# Patient Record
Sex: FEMALE
[De-identification: names, ages, dates, MRNs, and addresses within clinical notes are randomized per-mention and may not be internally consistent; named-entity substitution may affect disease eponyms.]

---

## 2023-06-01 PROBLEM — Z00.00 ENCOUNTER FOR PREVENTIVE HEALTH EXAMINATION: Status: ACTIVE | Noted: 2023-06-01

## 2023-06-07 ENCOUNTER — APPOINTMENT (OUTPATIENT)
Dept: ORTHOPEDIC SURGERY | Facility: CLINIC | Age: 43
End: 2023-06-07
Payer: COMMERCIAL

## 2023-06-07 VITALS — WEIGHT: 230 LBS | BODY MASS INDEX: 36.1 KG/M2 | HEIGHT: 67 IN

## 2023-06-07 DIAGNOSIS — Z82.49 FAMILY HISTORY OF ISCHEMIC HEART DISEASE AND OTHER DISEASES OF THE CIRCULATORY SYSTEM: ICD-10-CM

## 2023-06-07 DIAGNOSIS — Z80.49 FAMILY HISTORY OF MALIGNANT NEOPLASM OF OTHER GENITAL ORGANS: ICD-10-CM

## 2023-06-07 DIAGNOSIS — Z72.3 LACK OF PHYSICAL EXERCISE: ICD-10-CM

## 2023-06-07 DIAGNOSIS — Z78.9 OTHER SPECIFIED HEALTH STATUS: ICD-10-CM

## 2023-06-07 DIAGNOSIS — M67.00 SHORT ACHILLES TENDON (ACQUIRED), UNSPECIFIED ANKLE: ICD-10-CM

## 2023-06-07 DIAGNOSIS — M67.88 OTHER SPECIFIED DISORDERS OF SYNOVIUM AND TENDON, OTHER SITE: ICD-10-CM

## 2023-06-07 DIAGNOSIS — Z86.39 PERSONAL HISTORY OF OTHER ENDOCRINE, NUTRITIONAL AND METABOLIC DISEASE: ICD-10-CM

## 2023-06-07 PROCEDURE — 73610 X-RAY EXAM OF ANKLE: CPT | Mod: LT

## 2023-06-07 PROCEDURE — 99203 OFFICE O/P NEW LOW 30 MIN: CPT

## 2023-06-07 NOTE — HISTORY OF PRESENT ILLNESS
[de-identified] : Ms. Hannah is a 43 y/o  who comes in for evaluation for LEFT ankle pain more posterior than anterior that started at the end of April when she started doing–jump rope.  She joined a group and was doing it sometimes outside on a hard surface and sometimes indoors.  Pain did not start while doing it but perhaps a day later and therefore might be related.  There was not any specific injury.  She had gone to urgent care in early May and they prescribed Naprosyn and a muscle relaxer, cyclobenzaprine which she took for about a week and iced and rested.  She wears sneakers.  Pain did decrease.  Now she has intermittent 5 out of 10 pain worse with too much walking.\par She has not done any other treatment.  The pain was mostly in the back around the Achilles but she did have some anterior pain.  No prior injuries

## 2023-06-07 NOTE — ASSESSMENT
[FreeTextEntry1] : 42-year-old woman with left Achilles tendinopathy which I do think likely is from suddenly doing jump rope about 6 weeks ago\par She should do some stretching and gentle strengthening exercises.  As it is getting better she can do some eccentric strengthening that I showed her.  Weight loss can be helpful to get stress off the tendon.  Jumping may be difficult for her putting too much stress on the tendons and joint.  Certainly jumping in shoes with good cushion and on a softer surface may help.  I referred her to physical therapy.  She can take the Naprosyn 500 mg daily for another week and then as needed.  Wearing shoes with a heel lift can be helpful.\par Hopefully will continue to improve.  Follow-up in about 4 to 6 weeks.\par If its not getting better we may work-up further with other imaging or I may put her in a walking boot but I did not feel it was bad enough today to use the boot.

## 2023-06-07 NOTE — PHYSICAL EXAM
[LE] : Sensory: Intact in bilateral lower extremities [Normal RLE] : Right Lower Extremity: No scars, rashes, lesions, ulcers, skin intact [Normal LLE] : Left Lower Extremity: No scars, rashes, lesions, ulcers, skin intact [Normal Touch] : sensation intact for touch [Normal] : Gait: normal [Obese] : obese [de-identified] : LEFT foot and ankle\par Nonantalgic gait over short distance.  She can walk on her heels and toes.\par There is pain walking on her toes.\par Normal negative Tejeda test.\par Achilles tendon feels intact bilaterally without any significant thickening and no defect.\par There is tenderness in the left Achilles about 3 to 4 cm above the insertion.\par No significant anterior, medial or lateral ankle tenderness.\par 5/5 anterior tibial tendon, gastrocsoleus, peroneals, posterior tibial tendon, EHL.\par Foot and ankle are warm with normal capillary refill.  Dorsalis pedis 2+.\par Sensation is intact [de-identified] : \par \par X-rays taken today of LEFT ankle weightbearing 3 views were unremarkable.  Normal mortise.

## 2023-07-06 ENCOUNTER — APPOINTMENT (OUTPATIENT)
Dept: ORTHOPEDIC SURGERY | Facility: CLINIC | Age: 43
End: 2023-07-06